# Patient Record
Sex: FEMALE | URBAN - METROPOLITAN AREA
[De-identification: names, ages, dates, MRNs, and addresses within clinical notes are randomized per-mention and may not be internally consistent; named-entity substitution may affect disease eponyms.]

---

## 2023-04-20 ENCOUNTER — OFFICE (OUTPATIENT)
Dept: URBAN - METROPOLITAN AREA CLINIC 29 | Facility: CLINIC | Age: 68
Setting detail: OPHTHALMOLOGY
End: 2023-04-20
Payer: COMMERCIAL

## 2023-04-20 DIAGNOSIS — H25.13: ICD-10-CM

## 2023-04-20 DIAGNOSIS — H10.45: ICD-10-CM

## 2023-04-20 DIAGNOSIS — H16.223: ICD-10-CM

## 2023-04-20 DIAGNOSIS — H25.013: ICD-10-CM

## 2023-04-20 PROCEDURE — 92012 INTRM OPH EXAM EST PATIENT: CPT | Performed by: OPHTHALMOLOGY

## 2023-04-20 ASSESSMENT — REFRACTION_CURRENTRX
OD_OVR_VA: 20/
OS_OVR_VA: 20/
OD_VPRISM_DIRECTION: PROGS
OD_CYLINDER: -0.75
OD_AXIS: 65
OS_SPHERE: -2.50
OS_VPRISM_DIRECTION: PROGS
OS_AXIS: 114
OS_CYLINDER: -1.75
OD_ADD: +2.25
OD_SPHERE: -4.00
OS_ADD: +2.25

## 2023-04-20 ASSESSMENT — TEAR BREAK UP TIME (TBUT)
OD_TBUT: 2+
OS_TBUT: 2+

## 2023-04-20 ASSESSMENT — TONOMETRY: OS_IOP_MMHG: 18

## 2023-04-20 ASSESSMENT — REFRACTION_MANIFEST
OD_ADD: +2.50
OD_VA1: 20/20
OD_AXIS: 85
OD_CYLINDER: -1.75
OS_AXIS: 105
OD_SPHERE: -3.50
OS_SPHERE: -1.75
OS_VA1: 20/20
OS_CYLINDER: -1.75
OS_ADD: +2.50

## 2023-04-20 ASSESSMENT — SPHEQUIV_DERIVED
OD_SPHEQUIV: -4.375
OS_SPHEQUIV: -2.625

## 2023-04-20 ASSESSMENT — CONFRONTATIONAL VISUAL FIELD TEST (CVF)
OD_FINDINGS: FULL
OS_FINDINGS: FULL

## 2023-04-20 ASSESSMENT — OVER_REFRACTION
OD_SPHERE: -0.25
OD_VA1: 20/25
OD_SPHERE: PLANO
OS_SPHERE: PLANO
OS_VA1: 20/25
OS_SPHERE: -0.50

## 2023-04-20 ASSESSMENT — VISUAL ACUITY
OS_BCVA: 20/30-1
OD_BCVA: 20/20-2

## 2023-12-31 ENCOUNTER — RESULT REVIEW (OUTPATIENT)
Age: 68
End: 2023-12-31

## 2023-12-31 ENCOUNTER — TRANSCRIPTION ENCOUNTER (OUTPATIENT)
Age: 68
End: 2023-12-31

## 2024-01-11 PROBLEM — Z00.00 ENCOUNTER FOR PREVENTIVE HEALTH EXAMINATION: Status: ACTIVE | Noted: 2024-01-11

## 2024-01-18 DIAGNOSIS — Z87.09 PERSONAL HISTORY OF OTHER DISEASES OF THE RESPIRATORY SYSTEM: ICD-10-CM

## 2024-01-18 DIAGNOSIS — J12.1 RESPIRATORY SYNCYTIAL VIRUS PNEUMONIA: ICD-10-CM

## 2024-01-18 DIAGNOSIS — Z87.19 PERSONAL HISTORY OF OTHER DISEASES OF THE DIGESTIVE SYSTEM: ICD-10-CM

## 2024-01-18 DIAGNOSIS — Z86.79 PERSONAL HISTORY OF OTHER DISEASES OF THE CIRCULATORY SYSTEM: ICD-10-CM

## 2024-01-18 DIAGNOSIS — Z87.440 PERSONAL HISTORY OF URINARY (TRACT) INFECTIONS: ICD-10-CM

## 2024-01-18 RX ORDER — FLUTICASONE PROPIONATE AND SALMETEROL XINAFOATE 230; 21 UG/1; UG/1
AEROSOL, METERED RESPIRATORY (INHALATION)
Refills: 0 | Status: ACTIVE | COMMUNITY

## 2024-01-18 RX ORDER — TELMISARTAN 20 MG/1
TABLET ORAL
Refills: 0 | Status: ACTIVE | COMMUNITY

## 2024-01-18 RX ORDER — ATORVASTATIN CALCIUM 80 MG/1
TABLET, FILM COATED ORAL
Refills: 0 | Status: ACTIVE | COMMUNITY

## 2024-01-18 RX ORDER — HYDROCODONE BITARTRATE AND HOMATROPINE METHYLBROMIDE 1.5; 5 MG/5ML; MG/5ML
SOLUTION ORAL
Refills: 0 | Status: ACTIVE | COMMUNITY

## 2024-01-18 RX ORDER — IPRATROPIUM BROMIDE AND ALBUTEROL SULFATE 2.5; .5 MG/3ML; MG/3ML
SOLUTION RESPIRATORY (INHALATION)
Refills: 0 | Status: ACTIVE | COMMUNITY

## 2024-01-18 RX ORDER — LEVOFLOXACIN 750 MG/1
TABLET, FILM COATED ORAL
Refills: 0 | Status: ACTIVE | COMMUNITY

## 2024-01-18 RX ORDER — ALBUTEROL SULFATE 90 UG/1
INHALANT RESPIRATORY (INHALATION)
Refills: 0 | Status: ACTIVE | COMMUNITY

## 2024-01-19 ENCOUNTER — APPOINTMENT (OUTPATIENT)
Dept: SURGERY | Facility: CLINIC | Age: 69
End: 2024-01-19
Payer: COMMERCIAL

## 2024-01-19 VITALS
WEIGHT: 114 LBS | SYSTOLIC BLOOD PRESSURE: 142 MMHG | BODY MASS INDEX: 18.99 KG/M2 | DIASTOLIC BLOOD PRESSURE: 79 MMHG | HEART RATE: 68 BPM | HEIGHT: 65 IN

## 2024-01-19 DIAGNOSIS — K40.30 UNILATERAL INGUINAL HERNIA, WITH OBSTRUCTION, W/OUT GANGRENE, NOT SPECIFIED AS RECURRENT: ICD-10-CM

## 2024-01-19 PROCEDURE — 99024 POSTOP FOLLOW-UP VISIT: CPT

## 2024-01-19 NOTE — HISTORY OF PRESENT ILLNESS
[de-identified] : incarcerated right femoral hernia s/p robotic repair 12/31 [de-identified] : Has been doing well since surgery.  Tolerating diet.  c/o some right sided abdominal soreness.  denies N/V.

## 2024-01-19 NOTE — PHYSICAL EXAM
[JVD] : no jugular venous distention  [Normal Breath Sounds] : Normal breath sounds [Normal Rate and Rhythm] : normal rate and rhythm [No Rash or Lesion] : No rash or lesion [de-identified] : soft, NT/ND, incisions well healed

## 2024-01-19 NOTE — ASSESSMENT
[FreeTextEntry1] : s/p robotic right femoral hernia repair for incarcerated hernia.  doing well.  some abdominal wall soreness - resume regular activities - monitor soreness, f/u in 3-4 months as needed

## 2024-04-18 ENCOUNTER — APPOINTMENT (OUTPATIENT)
Dept: SURGERY | Facility: CLINIC | Age: 69
End: 2024-04-18
Payer: COMMERCIAL

## 2024-04-18 VITALS — SYSTOLIC BLOOD PRESSURE: 133 MMHG | TEMPERATURE: 97.5 F | DIASTOLIC BLOOD PRESSURE: 82 MMHG | HEART RATE: 86 BPM

## 2024-04-18 DIAGNOSIS — R10.30 LOWER ABDOMINAL PAIN, UNSPECIFIED: ICD-10-CM

## 2024-04-18 PROCEDURE — 99213 OFFICE O/P EST LOW 20 MIN: CPT

## 2024-04-18 NOTE — PHYSICAL EXAM
[JVD] : no jugular venous distention  [Normal Breath Sounds] : Normal breath sounds [Normal Rate and Rhythm] : normal rate and rhythm [No Rash or Lesion] : No rash or lesion [Alert] : alert [Calm] : calm [de-identified] : soft, NT/ND, no palpable hernia

## 2024-04-18 NOTE — REVIEW OF SYSTEMS
[As Noted in HPI] : as noted in HPI [Vomiting] : no vomiting [Constipation] : no constipation [Diarrhea] : no diarrhea [Heartburn] : no heartburn [Negative] : Genitourinary

## 2024-04-18 NOTE — HISTORY OF PRESENT ILLNESS
[de-identified] : abdominal discomfort [de-identified] : s/p robotic right inguinal hernia repair december 2023 for incarcerated femoral here.  presents today with complaints of some right sided abdominal discomfort.  pain is intermittent.  does not require pain meds.  also notes some abdominal wall "unevenness."

## 2024-04-18 NOTE — ASSESSMENT
[FreeTextEntry1] : 4 months post op.  here with some intermittent right sided abdominal discomfort will get ct a/ap

## 2024-04-19 ENCOUNTER — NON-APPOINTMENT (OUTPATIENT)
Age: 69
End: 2024-04-19

## 2024-04-24 ENCOUNTER — RESULT REVIEW (OUTPATIENT)
Age: 69
End: 2024-04-24

## 2024-04-25 ENCOUNTER — RESULT REVIEW (OUTPATIENT)
Age: 69
End: 2024-04-25

## 2024-04-26 ENCOUNTER — TRANSCRIPTION ENCOUNTER (OUTPATIENT)
Age: 69
End: 2024-04-26

## 2024-05-02 ENCOUNTER — TRANSCRIPTION ENCOUNTER (OUTPATIENT)
Age: 69
End: 2024-05-02

## 2024-08-16 ENCOUNTER — OFFICE (OUTPATIENT)
Dept: URBAN - METROPOLITAN AREA CLINIC 29 | Facility: CLINIC | Age: 69
Setting detail: OPHTHALMOLOGY
End: 2024-08-16
Payer: COMMERCIAL

## 2024-08-16 DIAGNOSIS — H01.005: ICD-10-CM

## 2024-08-16 DIAGNOSIS — H01.002: ICD-10-CM

## 2024-08-16 DIAGNOSIS — H25.13: ICD-10-CM

## 2024-08-16 DIAGNOSIS — H16.223: ICD-10-CM

## 2024-08-16 DIAGNOSIS — H00.022: ICD-10-CM

## 2024-08-16 DIAGNOSIS — H25.013: ICD-10-CM

## 2024-08-16 PROCEDURE — 99213 OFFICE O/P EST LOW 20 MIN: CPT | Performed by: OPHTHALMOLOGY

## 2024-08-16 ASSESSMENT — CONFRONTATIONAL VISUAL FIELD TEST (CVF)
OS_FINDINGS: FULL
OD_FINDINGS: FULL

## 2024-08-16 ASSESSMENT — LID EXAM ASSESSMENTS
OS_BLEPHARITIS: LLL T
OD_BLEPHARITIS: RLL 1+

## 2024-08-23 ENCOUNTER — OFFICE (OUTPATIENT)
Dept: URBAN - METROPOLITAN AREA CLINIC 29 | Facility: CLINIC | Age: 69
Setting detail: OPHTHALMOLOGY
End: 2024-08-23
Payer: COMMERCIAL

## 2024-08-23 DIAGNOSIS — H35.362: ICD-10-CM

## 2024-08-23 DIAGNOSIS — H01.002: ICD-10-CM

## 2024-08-23 DIAGNOSIS — H16.223: ICD-10-CM

## 2024-08-23 DIAGNOSIS — D31.01: ICD-10-CM

## 2024-08-23 DIAGNOSIS — H43.391: ICD-10-CM

## 2024-08-23 DIAGNOSIS — H43.813: ICD-10-CM

## 2024-08-23 DIAGNOSIS — H00.022: ICD-10-CM

## 2024-08-23 DIAGNOSIS — H01.005: ICD-10-CM

## 2024-08-23 DIAGNOSIS — H25.013: ICD-10-CM

## 2024-08-23 DIAGNOSIS — H25.13: ICD-10-CM

## 2024-08-23 PROCEDURE — 92014 COMPRE OPH EXAM EST PT 1/>: CPT | Performed by: OPHTHALMOLOGY

## 2024-08-23 PROCEDURE — 92201 OPSCPY EXTND RTA DRAW UNI/BI: CPT | Performed by: OPHTHALMOLOGY

## 2024-08-23 PROCEDURE — 92015 DETERMINE REFRACTIVE STATE: CPT | Performed by: OPHTHALMOLOGY

## 2024-08-23 ASSESSMENT — CONFRONTATIONAL VISUAL FIELD TEST (CVF)
OS_FINDINGS: FULL
OD_FINDINGS: FULL

## 2024-08-23 ASSESSMENT — LID EXAM ASSESSMENTS
OD_BLEPHARITIS: RLL 1+
OS_BLEPHARITIS: LLL T

## 2024-08-27 ENCOUNTER — OFFICE (OUTPATIENT)
Dept: URBAN - METROPOLITAN AREA CLINIC 122 | Facility: CLINIC | Age: 69
Setting detail: OPHTHALMOLOGY
End: 2024-08-27
Payer: COMMERCIAL

## 2024-08-27 DIAGNOSIS — H00.022: ICD-10-CM

## 2024-08-27 DIAGNOSIS — H01.005: ICD-10-CM

## 2024-08-27 DIAGNOSIS — H01.002: ICD-10-CM

## 2024-08-27 PROBLEM — H43.813 POSTERIOR VITREOUS DETACHMENT; BOTH EYES: Status: ACTIVE | Noted: 2024-08-23

## 2024-08-27 PROCEDURE — 92012 INTRM OPH EXAM EST PATIENT: CPT | Performed by: OPHTHALMOLOGY

## 2024-08-27 ASSESSMENT — LID EXAM ASSESSMENTS
OS_BLEPHARITIS: LLL T
OD_BLEPHARITIS: RLL 1+

## 2024-08-27 ASSESSMENT — CONFRONTATIONAL VISUAL FIELD TEST (CVF)
OD_FINDINGS: FULL
OS_FINDINGS: FULL

## 2024-09-17 ENCOUNTER — RX ONLY (RX ONLY)
Age: 69
End: 2024-09-17

## 2024-09-17 ENCOUNTER — OFFICE (OUTPATIENT)
Dept: URBAN - METROPOLITAN AREA CLINIC 122 | Facility: CLINIC | Age: 69
Setting detail: OPHTHALMOLOGY
End: 2024-09-17
Payer: COMMERCIAL

## 2024-09-17 DIAGNOSIS — H16.223: ICD-10-CM

## 2024-09-17 DIAGNOSIS — H01.005: ICD-10-CM

## 2024-09-17 DIAGNOSIS — H00.022: ICD-10-CM

## 2024-09-17 DIAGNOSIS — H01.002: ICD-10-CM

## 2024-09-17 PROCEDURE — 92012 INTRM OPH EXAM EST PATIENT: CPT

## 2024-09-17 ASSESSMENT — LID EXAM ASSESSMENTS
OD_BLEPHARITIS: RLL 1+
OS_BLEPHARITIS: LLL T

## 2024-10-01 ENCOUNTER — OFFICE (OUTPATIENT)
Dept: URBAN - METROPOLITAN AREA CLINIC 122 | Facility: CLINIC | Age: 69
Setting detail: OPHTHALMOLOGY
End: 2024-10-01
Payer: COMMERCIAL

## 2024-10-01 DIAGNOSIS — H01.002: ICD-10-CM

## 2024-10-01 DIAGNOSIS — H00.022: ICD-10-CM

## 2024-10-01 DIAGNOSIS — H16.223: ICD-10-CM

## 2024-10-01 DIAGNOSIS — H01.005: ICD-10-CM

## 2024-10-01 PROCEDURE — 92012 INTRM OPH EXAM EST PATIENT: CPT

## 2024-10-01 ASSESSMENT — REFRACTION_CURRENTRX
OD_CYLINDER: -0.75
OD_CYLINDER: +0.50
OS_OVR_VA: 20/
OD_ADD: +2.75
OS_CYLINDER: -1.75
OS_SPHERE: -4.25
OD_OVR_VA: 20/
OS_ADD: +2.75
OS_VPRISM_DIRECTION: PROGS
OS_AXIS: 114
OS_ADD: +2.25
OD_SPHERE: -4.00
OD_AXIS: 65
OD_AXIS: 155
OD_VPRISM_DIRECTION: PROGS
OS_CYLINDER: +1.75
OS_OVR_VA: 20/
OD_OVR_VA: 20/
OS_AXIS: 035
OS_VPRISM_DIRECTION: PROGS
OD_SPHERE: -5.00
OS_SPHERE: -2.50
OD_ADD: +2.25
OD_VPRISM_DIRECTION: PROGS

## 2024-10-01 ASSESSMENT — REFRACTION_MANIFEST
OD_CYLINDER: -2.25
OD_CYLINDER: -1.75
OD_SPHERE: -3.00
OS_SPHERE: -1.00
OS_VA1: 20/20-2
OD_ADD: +2.50
OS_CYLINDER: -1.75
OS_SPHERE: -1.75
OS_AXIS: 105
OS_VA1: 20/20
OD_SPHERE: -3.50
OD_VA1: 20/20-2
OS_AXIS: 095
OD_AXIS: 085
OD_ADD: +2.50
OS_CYLINDER: -2.00
OS_ADD: +2.50
OD_VA1: 20/20
OD_AXIS: 85
OS_ADD: +2.50

## 2024-10-01 ASSESSMENT — KERATOMETRY
OS_K1POWER_DIOPTERS: 44.00
OD_K2POWER_DIOPTERS: 45.25
OS_AXISANGLE_DEGREES: 020
OD_K1POWER_DIOPTERS: 45.00
OD_AXISANGLE_DEGREES: 135
OS_K2POWER_DIOPTERS: 45.25

## 2024-10-01 ASSESSMENT — OVER_REFRACTION
OS_SPHERE: -0.50
OD_VA1: 20/25
OD_SPHERE: PLANO
OD_SPHERE: -0.25
OS_SPHERE: PLANO
OS_VA1: 20/25

## 2024-10-01 ASSESSMENT — TEAR BREAK UP TIME (TBUT)
OS_TBUT: 2+
OD_TBUT: 2+

## 2024-10-01 ASSESSMENT — VISUAL ACUITY
OD_BCVA: 20/20-2
OS_BCVA: 20/20-

## 2024-10-01 ASSESSMENT — REFRACTION_AUTOREFRACTION
OD_CYLINDER: +2.25
OS_AXIS: 010
OD_AXIS: 170
OD_SPHERE: -5.25
OS_SPHERE: -3.25
OS_CYLINDER: +2.00

## 2024-10-01 ASSESSMENT — LID EXAM ASSESSMENTS
OD_BLEPHARITIS: RLL 1+
OS_BLEPHARITIS: LLL T

## 2024-10-01 ASSESSMENT — TONOMETRY
OS_IOP_MMHG: 16
OD_IOP_MMHG: 16

## 2024-10-25 ENCOUNTER — RX ONLY (RX ONLY)
Age: 69
End: 2024-10-25

## 2024-10-25 ENCOUNTER — OFFICE (OUTPATIENT)
Dept: URBAN - METROPOLITAN AREA CLINIC 29 | Facility: CLINIC | Age: 69
Setting detail: OPHTHALMOLOGY
End: 2024-10-25
Payer: COMMERCIAL

## 2024-10-25 DIAGNOSIS — H00.022: ICD-10-CM

## 2024-10-25 DIAGNOSIS — H02.89: ICD-10-CM

## 2024-10-25 DIAGNOSIS — D23.122: ICD-10-CM

## 2024-10-25 DIAGNOSIS — H01.002: ICD-10-CM

## 2024-10-25 DIAGNOSIS — H16.223: ICD-10-CM

## 2024-10-25 DIAGNOSIS — H01.005: ICD-10-CM

## 2024-10-25 PROCEDURE — 92285 EXTERNAL OCULAR PHOTOGRAPHY: CPT | Performed by: OPHTHALMOLOGY

## 2024-10-25 PROCEDURE — 99213 OFFICE O/P EST LOW 20 MIN: CPT | Performed by: OPHTHALMOLOGY

## 2024-10-25 ASSESSMENT — REFRACTION_MANIFEST
OD_AXIS: 085
OS_SPHERE: -1.00
OD_CYLINDER: -2.25
OD_ADD: +2.50
OS_ADD: +2.50
OS_VA1: 20/20-2
OD_VA1: 20/20-2
OS_AXIS: 105
OD_VA1: 20/20
OS_ADD: +2.50
OD_SPHERE: -3.00
OS_CYLINDER: -1.75
OS_CYLINDER: -2.00
OD_ADD: +2.50
OS_VA1: 20/20
OD_CYLINDER: -1.75
OS_SPHERE: -1.75
OD_SPHERE: -3.50
OD_AXIS: 85
OS_AXIS: 095

## 2024-10-25 ASSESSMENT — REFRACTION_CURRENTRX
OD_OVR_VA: 20/
OS_CYLINDER: +1.75
OD_CYLINDER: +0.50
OD_CYLINDER: -0.75
OS_OVR_VA: 20/
OS_SPHERE: -2.50
OS_OVR_VA: 20/
OS_SPHERE: -4.25
OS_VPRISM_DIRECTION: PROGS
OS_ADD: +2.25
OD_AXIS: 155
OD_ADD: +2.25
OD_OVR_VA: 20/
OS_AXIS: 035
OS_CYLINDER: -1.75
OS_AXIS: 114
OD_SPHERE: -4.00
OS_VPRISM_DIRECTION: PROGS
OD_ADD: +2.75
OD_VPRISM_DIRECTION: PROGS
OD_SPHERE: -5.00
OS_ADD: +2.75
OD_AXIS: 65
OD_VPRISM_DIRECTION: PROGS

## 2024-10-25 ASSESSMENT — TEAR BREAK UP TIME (TBUT)
OD_TBUT: 2+
OS_TBUT: 2+

## 2024-10-25 ASSESSMENT — LID EXAM ASSESSMENTS
OD_BLEPHARITIS: RLL 1+
OS_BLEPHARITIS: LLL T
OD_MEIBOMITIS: RLL 1+

## 2024-10-25 ASSESSMENT — REFRACTION_AUTOREFRACTION
OS_AXIS: 010
OS_SPHERE: -3.25
OS_CYLINDER: +2.00
OD_CYLINDER: +2.25
OD_AXIS: 170
OD_SPHERE: -5.25

## 2024-10-25 ASSESSMENT — CONFRONTATIONAL VISUAL FIELD TEST (CVF)
OD_FINDINGS: FULL
OS_FINDINGS: FULL

## 2024-10-25 ASSESSMENT — TONOMETRY
OS_IOP_MMHG: 14
OD_IOP_MMHG: 14

## 2024-10-25 ASSESSMENT — OVER_REFRACTION
OS_SPHERE: PLANO
OS_SPHERE: -0.50
OD_SPHERE: PLANO
OD_SPHERE: -0.25
OD_VA1: 20/25
OS_VA1: 20/25

## 2024-10-25 ASSESSMENT — VISUAL ACUITY
OD_BCVA: 20/25-2
OS_BCVA: 20/25+1

## 2024-10-25 ASSESSMENT — KERATOMETRY
OD_K2POWER_DIOPTERS: 45.25
OS_K2POWER_DIOPTERS: 45.25
OD_AXISANGLE_DEGREES: 135
OS_AXISANGLE_DEGREES: 020
OD_K1POWER_DIOPTERS: 45.00
OS_K1POWER_DIOPTERS: 44.00

## 2024-11-06 ENCOUNTER — RX ONLY (RX ONLY)
Age: 69
End: 2024-11-06

## 2024-11-06 ENCOUNTER — OFFICE (OUTPATIENT)
Dept: URBAN - METROPOLITAN AREA CLINIC 122 | Facility: CLINIC | Age: 69
Setting detail: OPHTHALMOLOGY
End: 2024-11-06
Payer: COMMERCIAL

## 2024-11-06 DIAGNOSIS — H16.223: ICD-10-CM

## 2024-11-06 DIAGNOSIS — H01.002: ICD-10-CM

## 2024-11-06 DIAGNOSIS — H00.12: ICD-10-CM

## 2024-11-06 DIAGNOSIS — H02.89: ICD-10-CM

## 2024-11-06 DIAGNOSIS — H01.005: ICD-10-CM

## 2024-11-06 DIAGNOSIS — D23.122: ICD-10-CM

## 2024-11-06 PROCEDURE — 92012 INTRM OPH EXAM EST PATIENT: CPT | Performed by: OPHTHALMOLOGY

## 2024-11-06 ASSESSMENT — REFRACTION_MANIFEST
OD_VA1: 20/20-2
OS_CYLINDER: -2.00
OS_AXIS: 105
OD_ADD: +2.50
OD_CYLINDER: -1.75
OS_ADD: +2.50
OD_ADD: +2.50
OS_VA1: 20/20-2
OD_SPHERE: -3.00
OS_SPHERE: -1.75
OD_SPHERE: -3.50
OS_AXIS: 095
OS_CYLINDER: -1.75
OD_CYLINDER: -2.25
OS_ADD: +2.50
OS_SPHERE: -1.00
OD_AXIS: 85
OS_VA1: 20/20
OD_VA1: 20/20
OD_AXIS: 085

## 2024-11-06 ASSESSMENT — REFRACTION_CURRENTRX
OS_CYLINDER: +1.75
OD_OVR_VA: 20/
OS_VPRISM_DIRECTION: PROGS
OD_ADD: +2.75
OS_CYLINDER: -1.75
OD_CYLINDER: +0.50
OS_ADD: +2.75
OS_SPHERE: -2.50
OD_AXIS: 155
OS_OVR_VA: 20/
OS_ADD: +2.25
OD_SPHERE: -4.00
OS_VPRISM_DIRECTION: PROGS
OD_CYLINDER: -0.75
OS_SPHERE: -4.25
OS_AXIS: 035
OD_AXIS: 65
OD_OVR_VA: 20/
OS_OVR_VA: 20/
OD_VPRISM_DIRECTION: PROGS
OD_VPRISM_DIRECTION: PROGS
OD_ADD: +2.25
OS_AXIS: 114
OD_SPHERE: -5.00

## 2024-11-06 ASSESSMENT — OVER_REFRACTION
OD_SPHERE: PLANO
OS_SPHERE: PLANO
OS_SPHERE: -0.50
OD_VA1: 20/25
OS_VA1: 20/25
OD_SPHERE: -0.25

## 2024-11-06 ASSESSMENT — REFRACTION_AUTOREFRACTION
OS_AXIS: 010
OD_CYLINDER: +2.25
OD_SPHERE: -5.25
OS_CYLINDER: +2.00
OD_AXIS: 170
OS_SPHERE: -3.25

## 2024-11-06 ASSESSMENT — KERATOMETRY
OD_K1POWER_DIOPTERS: 45.00
OS_K2POWER_DIOPTERS: 45.25
OD_K2POWER_DIOPTERS: 45.25
OS_AXISANGLE_DEGREES: 020
OS_K1POWER_DIOPTERS: 44.00
OD_AXISANGLE_DEGREES: 135

## 2024-11-06 ASSESSMENT — LID EXAM ASSESSMENTS
OD_BLEPHARITIS: RLL 1+
OD_MEIBOMITIS: RLL 1+
OS_BLEPHARITIS: LLL T

## 2024-11-06 ASSESSMENT — CONFRONTATIONAL VISUAL FIELD TEST (CVF)
OS_FINDINGS: FULL
OD_FINDINGS: FULL

## 2024-11-06 ASSESSMENT — TEAR BREAK UP TIME (TBUT)
OS_TBUT: 2+
OD_TBUT: 2+

## 2024-11-06 ASSESSMENT — VISUAL ACUITY
OS_BCVA: 20/30
OD_BCVA: 20/25-2

## 2024-11-06 ASSESSMENT — TONOMETRY: OD_IOP_MMHG: 16

## 2024-11-25 ENCOUNTER — RX ONLY (RX ONLY)
Age: 69
End: 2024-11-25

## 2024-11-25 ENCOUNTER — OFFICE (OUTPATIENT)
Dept: URBAN - METROPOLITAN AREA CLINIC 122 | Facility: CLINIC | Age: 69
Setting detail: OPHTHALMOLOGY
End: 2024-11-25
Payer: COMMERCIAL

## 2024-11-25 DIAGNOSIS — H01.005: ICD-10-CM

## 2024-11-25 DIAGNOSIS — H43.391: ICD-10-CM

## 2024-11-25 DIAGNOSIS — D31.01: ICD-10-CM

## 2024-11-25 DIAGNOSIS — H35.362: ICD-10-CM

## 2024-11-25 DIAGNOSIS — H43.813: ICD-10-CM

## 2024-11-25 DIAGNOSIS — H16.223: ICD-10-CM

## 2024-11-25 DIAGNOSIS — H25.013: ICD-10-CM

## 2024-11-25 DIAGNOSIS — H01.002: ICD-10-CM

## 2024-11-25 DIAGNOSIS — H00.12: ICD-10-CM

## 2024-11-25 DIAGNOSIS — D23.122: ICD-10-CM

## 2024-11-25 DIAGNOSIS — H02.89: ICD-10-CM

## 2024-11-25 PROCEDURE — 92014 COMPRE OPH EXAM EST PT 1/>: CPT | Performed by: OPHTHALMOLOGY

## 2024-11-25 PROCEDURE — 92134 CPTRZ OPH DX IMG PST SGM RTA: CPT | Performed by: OPHTHALMOLOGY

## 2024-11-25 ASSESSMENT — REFRACTION_MANIFEST
OD_AXIS: 85
OS_SPHERE: -1.75
OS_VA1: 20/20-2
OS_ADD: +2.50
OD_AXIS: 085
OD_CYLINDER: -2.25
OS_SPHERE: -1.00
OS_AXIS: 095
OD_SPHERE: -3.50
OD_VA1: 20/20
OS_ADD: +2.50
OD_CYLINDER: -1.75
OS_CYLINDER: -2.00
OS_VA1: 20/20
OS_AXIS: 105
OD_SPHERE: -3.00
OD_VA1: 20/20-2
OS_CYLINDER: -1.75
OD_ADD: +2.50
OD_ADD: +2.50

## 2024-11-25 ASSESSMENT — OVER_REFRACTION
OS_SPHERE: -0.50
OS_SPHERE: PLANO
OD_SPHERE: PLANO
OD_VA1: 20/25
OS_VA1: 20/25
OD_SPHERE: -0.25

## 2024-11-25 ASSESSMENT — LID EXAM ASSESSMENTS
OD_MEIBOMITIS: RLL 1+
OS_BLEPHARITIS: LLL T
OD_BLEPHARITIS: RLL 1+

## 2024-11-25 ASSESSMENT — TONOMETRY
OS_IOP_MMHG: 13
OD_IOP_MMHG: 12

## 2024-11-25 ASSESSMENT — KERATOMETRY
OS_K1POWER_DIOPTERS: 44.00
OS_K2POWER_DIOPTERS: 45.25
OD_AXISANGLE_DEGREES: 135
OD_K1POWER_DIOPTERS: 45.00
OD_K2POWER_DIOPTERS: 45.25
OS_AXISANGLE_DEGREES: 020

## 2024-11-25 ASSESSMENT — REFRACTION_CURRENTRX
OD_AXIS: 66
OD_AXIS: 155
OD_OVR_VA: 20/
OS_AXIS: 116
OS_OVR_VA: 20/
OD_VPRISM_DIRECTION: PROGS
OS_CYLINDER: +1.75
OD_SPHERE: -5.00
OS_CYLINDER: -1.75
OD_CYLINDER: --1.00
OD_SPHERE: -4.25
OD_ADD: +2.25
OD_ADD: +2.75
OS_ADD: +2.25
OS_VPRISM_DIRECTION: PROGS
OS_SPHERE: -2.25
OD_OVR_VA: 20/
OS_AXIS: 035
OS_SPHERE: -4.25
OS_ADD: +2.75
OD_CYLINDER: +0.50
OS_VPRISM_DIRECTION: PROGS
OS_OVR_VA: 20/
OD_VPRISM_DIRECTION: PROGS

## 2024-11-25 ASSESSMENT — VISUAL ACUITY
OD_BCVA: 20/30
OS_BCVA: 20/40

## 2024-11-25 ASSESSMENT — CONFRONTATIONAL VISUAL FIELD TEST (CVF)
OS_FINDINGS: FULL
OD_FINDINGS: FULL

## 2024-11-25 ASSESSMENT — REFRACTION_AUTOREFRACTION
OD_AXIS: 74
OS_AXIS: 95
OS_CYLINDER: -2.25
OS_SPHERE: -1.50
OD_CYLINDER: -1.75
OD_SPHERE: -2.75

## 2024-11-25 ASSESSMENT — TEAR BREAK UP TIME (TBUT)
OD_TBUT: 2+
OS_TBUT: 2+

## 2024-12-10 ENCOUNTER — OFFICE (OUTPATIENT)
Dept: URBAN - METROPOLITAN AREA CLINIC 122 | Facility: CLINIC | Age: 69
Setting detail: OPHTHALMOLOGY
End: 2024-12-10
Payer: COMMERCIAL

## 2024-12-10 DIAGNOSIS — H00.12: ICD-10-CM

## 2024-12-10 PROCEDURE — 67800 REMOVE EYELID LESION: CPT | Mod: E4 | Performed by: OPHTHALMOLOGY

## 2024-12-10 ASSESSMENT — REFRACTION_MANIFEST
OS_VA1: 20/20
OS_CYLINDER: -2.00
OS_CYLINDER: -1.75
OD_VA1: 20/20
OS_ADD: +2.50
OS_AXIS: 095
OD_AXIS: 085
OD_AXIS: 85
OD_ADD: +2.50
OS_AXIS: 105
OD_SPHERE: -3.50
OD_SPHERE: -3.00
OD_CYLINDER: -2.25
OS_SPHERE: -1.75
OD_CYLINDER: -1.75
OS_SPHERE: -1.00
OD_ADD: +2.50
OD_VA1: 20/20-2
OS_VA1: 20/20-2
OS_ADD: +2.50

## 2024-12-10 ASSESSMENT — LID EXAM ASSESSMENTS
OD_BLEPHARITIS: RLL 1+
OD_MEIBOMITIS: RLL 1+
OS_BLEPHARITIS: LLL T

## 2024-12-10 ASSESSMENT — OVER_REFRACTION
OS_SPHERE: PLANO
OD_SPHERE: PLANO
OS_SPHERE: -0.50
OD_SPHERE: -0.25
OD_VA1: 20/25
OS_VA1: 20/25

## 2024-12-10 ASSESSMENT — KERATOMETRY
OD_K2POWER_DIOPTERS: 45.25
OS_K2POWER_DIOPTERS: 45.25
OS_AXISANGLE_DEGREES: 020
OS_K1POWER_DIOPTERS: 44.00
OD_AXISANGLE_DEGREES: 135
OD_K1POWER_DIOPTERS: 45.00

## 2024-12-10 ASSESSMENT — REFRACTION_CURRENTRX
OD_VPRISM_DIRECTION: PROGS
OS_CYLINDER: -1.75
OS_SPHERE: -2.25
OD_SPHERE: -5.00
OS_VPRISM_DIRECTION: PROGS
OD_ADD: +2.25
OS_ADD: +2.25
OD_CYLINDER: +0.50
OD_OVR_VA: 20/
OD_SPHERE: -4.25
OD_VPRISM_DIRECTION: PROGS
OS_SPHERE: -4.25
OS_VPRISM_DIRECTION: PROGS
OS_CYLINDER: +1.75
OS_AXIS: 035
OS_AXIS: 116
OD_AXIS: 66
OS_OVR_VA: 20/
OD_ADD: +2.75
OS_ADD: +2.75
OS_OVR_VA: 20/
OD_CYLINDER: --1.00
OD_AXIS: 155
OD_OVR_VA: 20/

## 2024-12-10 ASSESSMENT — TEAR BREAK UP TIME (TBUT)
OS_TBUT: 2+
OD_TBUT: 2+

## 2024-12-10 ASSESSMENT — REFRACTION_AUTOREFRACTION
OS_CYLINDER: -2.25
OS_AXIS: 95
OD_SPHERE: -2.75
OD_CYLINDER: -1.75
OD_AXIS: 74
OS_SPHERE: -1.50

## 2024-12-10 ASSESSMENT — VISUAL ACUITY
OD_BCVA: 20/30
OS_BCVA: 20/40

## 2025-01-10 ENCOUNTER — OFFICE (OUTPATIENT)
Dept: URBAN - METROPOLITAN AREA CLINIC 122 | Facility: CLINIC | Age: 70
Setting detail: OPHTHALMOLOGY
End: 2025-01-10
Payer: COMMERCIAL

## 2025-01-10 DIAGNOSIS — H25.013: ICD-10-CM

## 2025-01-10 DIAGNOSIS — H01.005: ICD-10-CM

## 2025-01-10 DIAGNOSIS — H01.002: ICD-10-CM

## 2025-01-10 DIAGNOSIS — D23.122: ICD-10-CM

## 2025-01-10 DIAGNOSIS — H43.391: ICD-10-CM

## 2025-01-10 DIAGNOSIS — H35.362: ICD-10-CM

## 2025-01-10 DIAGNOSIS — H02.89: ICD-10-CM

## 2025-01-10 DIAGNOSIS — H43.813: ICD-10-CM

## 2025-01-10 DIAGNOSIS — H52.7: ICD-10-CM

## 2025-01-10 DIAGNOSIS — H25.13: ICD-10-CM

## 2025-01-10 DIAGNOSIS — D31.01: ICD-10-CM

## 2025-01-10 DIAGNOSIS — H16.223: ICD-10-CM

## 2025-01-10 PROCEDURE — 92012 INTRM OPH EXAM EST PATIENT: CPT | Performed by: OPHTHALMOLOGY

## 2025-01-10 PROCEDURE — 92015 DETERMINE REFRACTIVE STATE: CPT | Performed by: OPHTHALMOLOGY

## 2025-01-10 ASSESSMENT — REFRACTION_MANIFEST
OS_CYLINDER: -1.75
OD_VA1: 20/20
OS_VA1: 20/20
OD_SPHERE: -3.50
OS_ADD: +2.50
OD_AXIS: 85
OS_AXIS: 095
OD_SPHERE: -3.00
OD_VA1: 20/20-2
OS_VA1: 20/20
OD_ADD: +2.50
OS_ADD: +2.50
OS_SPHERE: -1.75
OD_ADD: +2.50
OS_ADD: +2.50
OS_AXIS: 80
OS_AXIS: 105
OS_SPHERE: -1.00
OS_VA1: 20/20-2
OD_ADD: +2.50
OD_CYLINDER: -2.25
OD_CYLINDER: -2.00
OD_AXIS: 085
OS_SPHERE: -2.25
OD_VA1: 20/20
OD_CYLINDER: -1.75
OS_CYLINDER: -2.00
OD_AXIS: 65
OD_SPHERE: -2.50
OS_CYLINDER: -2.00

## 2025-01-10 ASSESSMENT — KERATOMETRY
OS_AXISANGLE_DEGREES: 020
OD_K2POWER_DIOPTERS: 45.25
OS_K2POWER_DIOPTERS: 45.25
OD_AXISANGLE_DEGREES: 135
OD_K1POWER_DIOPTERS: 45.00
OS_K1POWER_DIOPTERS: 44.00

## 2025-01-10 ASSESSMENT — REFRACTION_CURRENTRX
OS_VPRISM_DIRECTION: PROGS
OD_CYLINDER: +0.50
OD_AXIS: 155
OS_SPHERE: -4.25
OD_ADD: +2.25
OD_OVR_VA: 20/
OS_AXIS: 035
OD_SPHERE: -5.00
OS_ADD: +2.25
OS_OVR_VA: 20/
OD_VPRISM_DIRECTION: PROGS
OD_VPRISM_DIRECTION: PROGS
OS_SPHERE: -2.25
OS_AXIS: 116
OS_OVR_VA: 20/
OS_ADD: +2.75
OD_ADD: +2.75
OD_CYLINDER: -1.00
OD_SPHERE: -4.25
OD_AXIS: 66
OD_OVR_VA: 20/
OS_CYLINDER: -1.75
OS_VPRISM_DIRECTION: PROGS
OS_CYLINDER: +1.75

## 2025-01-10 ASSESSMENT — LID EXAM ASSESSMENTS
OD_BLEPHARITIS: RLL 1+
OD_MEIBOMITIS: RLL 1+
OS_BLEPHARITIS: LLL T

## 2025-01-10 ASSESSMENT — REFRACTION_AUTOREFRACTION
OS_SPHERE: -1.25
OS_CYLINDER: -2.00
OD_CYLINDER: -2.00
OD_SPHERE: -2.50
OD_AXIS: 76
OS_AXIS: 99

## 2025-01-10 ASSESSMENT — OVER_REFRACTION
OD_SPHERE: PLANO
OD_SPHERE: -0.25
OD_VA1: 20/25
OS_SPHERE: -0.50
OS_VA1: 20/25
OS_SPHERE: PLANO

## 2025-01-10 ASSESSMENT — CONFRONTATIONAL VISUAL FIELD TEST (CVF)
OS_FINDINGS: FULL
OD_FINDINGS: FULL

## 2025-01-10 ASSESSMENT — VISUAL ACUITY
OD_BCVA: 20/25
OS_BCVA: 20/30-

## 2025-01-10 ASSESSMENT — TONOMETRY
OS_IOP_MMHG: 14
OD_IOP_MMHG: 14

## 2025-01-10 ASSESSMENT — TEAR BREAK UP TIME (TBUT)
OS_TBUT: 2+
OD_TBUT: 2+

## 2025-01-13 ENCOUNTER — OFFICE (OUTPATIENT)
Dept: URBAN - METROPOLITAN AREA CLINIC 29 | Facility: CLINIC | Age: 70
Setting detail: OPHTHALMOLOGY
End: 2025-01-13
Payer: COMMERCIAL

## 2025-01-13 DIAGNOSIS — H02.822: ICD-10-CM

## 2025-01-13 PROCEDURE — 67840 REMOVE EYELID LESION: CPT | Mod: E4 | Performed by: OPHTHALMOLOGY

## 2025-01-13 PROCEDURE — 99214 OFFICE O/P EST MOD 30 MIN: CPT | Mod: 25 | Performed by: OPHTHALMOLOGY

## 2025-01-13 ASSESSMENT — KERATOMETRY
OD_AXISANGLE_DEGREES: 135
OD_K1POWER_DIOPTERS: 45.00
OD_K2POWER_DIOPTERS: 45.25
OS_K1POWER_DIOPTERS: 44.00
OS_K2POWER_DIOPTERS: 45.25
OS_AXISANGLE_DEGREES: 020

## 2025-01-13 ASSESSMENT — REFRACTION_AUTOREFRACTION
OD_AXIS: 76
OS_SPHERE: -1.25
OD_CYLINDER: -2.00
OS_CYLINDER: -2.00
OS_AXIS: 99
OD_SPHERE: -2.50

## 2025-01-13 ASSESSMENT — LID EXAM ASSESSMENTS
OS_BLEPHARITIS: LLL T
OD_MEIBOMITIS: RLL 1+
OD_BLEPHARITIS: RLL 1+

## 2025-01-13 ASSESSMENT — TEAR BREAK UP TIME (TBUT)
OS_TBUT: 2+
OD_TBUT: 2+

## 2025-01-13 ASSESSMENT — VISUAL ACUITY
OS_BCVA: 20/25-2
OD_BCVA: 20/25-2

## 2025-01-15 ENCOUNTER — RX ONLY (RX ONLY)
Age: 70
End: 2025-01-15

## 2025-01-15 ENCOUNTER — OFFICE (OUTPATIENT)
Dept: URBAN - METROPOLITAN AREA CLINIC 122 | Facility: CLINIC | Age: 70
Setting detail: OPHTHALMOLOGY
End: 2025-01-15
Payer: COMMERCIAL

## 2025-01-15 DIAGNOSIS — H02.9: ICD-10-CM

## 2025-01-15 DIAGNOSIS — H02.822: ICD-10-CM

## 2025-01-15 PROCEDURE — 99024 POSTOP FOLLOW-UP VISIT: CPT | Performed by: OPHTHALMOLOGY

## 2025-01-15 ASSESSMENT — REFRACTION_AUTOREFRACTION
OS_AXIS: 99
OS_SPHERE: -1.25
OS_CYLINDER: -2.00
OD_AXIS: 76
OD_SPHERE: -2.50
OD_CYLINDER: -2.00

## 2025-01-15 ASSESSMENT — KERATOMETRY
OD_AXISANGLE_DEGREES: 135
OS_K2POWER_DIOPTERS: 45.25
OD_K2POWER_DIOPTERS: 45.25
OD_K1POWER_DIOPTERS: 45.00
OS_AXISANGLE_DEGREES: 020
OS_K1POWER_DIOPTERS: 44.00

## 2025-01-15 ASSESSMENT — LID EXAM ASSESSMENTS
OD_MEIBOMITIS: RLL 1+
OD_ECCHYMOSIS: RLL 2+
OD_EDEMA: RLL 1+
OD_BLEPHARITIS: RLL 1+
OS_BLEPHARITIS: LLL T

## 2025-01-15 ASSESSMENT — TEAR BREAK UP TIME (TBUT)
OD_TBUT: 2+
OS_TBUT: 2+

## 2025-01-15 ASSESSMENT — VISUAL ACUITY
OD_BCVA: 20/25-2
OS_BCVA: 20/25-2

## 2025-01-28 ENCOUNTER — OFFICE (OUTPATIENT)
Dept: URBAN - METROPOLITAN AREA CLINIC 122 | Facility: CLINIC | Age: 70
Setting detail: OPHTHALMOLOGY
End: 2025-01-28
Payer: COMMERCIAL

## 2025-01-28 DIAGNOSIS — H52.7: ICD-10-CM

## 2025-01-28 PROBLEM — H02.822 LID LESION; RIGHT LOWER LID: Status: ACTIVE | Noted: 2025-01-13

## 2025-01-28 PROBLEM — H02.9 EYELID DISORDER, UNSPECIFIED ; RIGHT EYE: Status: ACTIVE | Noted: 2025-01-15

## 2025-01-28 PROCEDURE — CLEST CL LENS FIT ESTABLISHED WEARER FITTING ONLY

## 2025-01-28 ASSESSMENT — REFRACTION_MANIFEST
OD_SPHERE: -2.50
OS_ADD: +2.50
OS_AXIS: 095
OU_VA: 20/20
OS_CYLINDER: -2.25
OS_VA1: 20/20-2
OD_AXIS: 075
OS_SPHERE: -1.25
OD_VA1: 20/20-
OD_CYLINDER: -2.00
OD_ADD: +2.50

## 2025-01-28 ASSESSMENT — REFRACTION_CURRENTRX
OD_ADD: +2.25
OS_ADD: +2.25
OS_AXIS: 125
OS_CYLINDER: -1.75
OD_OVR_VA: 20/
OD_CYLINDER: -0.75
OS_OVR_VA: 20/
OS_SPHERE: -2.25
OD_SPHERE: -3.50
OD_AXIS: 078

## 2025-01-28 ASSESSMENT — REFRACTION_AUTOREFRACTION
OD_AXIS: 78
OS_AXIS: 96
OS_CYLINDER: -2.25
OD_SPHERE: -2.50
OD_CYLINDER: -2.00
OS_SPHERE: -1.25

## 2025-01-28 ASSESSMENT — CONFRONTATIONAL VISUAL FIELD TEST (CVF)
OD_FINDINGS: FULL
OS_FINDINGS: FULL

## 2025-01-28 ASSESSMENT — KERATOMETRY
OS_AXISANGLE_DEGREES: 020
OD_K1POWER_DIOPTERS: 45.00
OS_K1POWER_DIOPTERS: 44.00
OD_K2POWER_DIOPTERS: 45.25
OS_K2POWER_DIOPTERS: 45.25
OD_AXISANGLE_DEGREES: 135

## 2025-01-28 ASSESSMENT — VISUAL ACUITY
OD_BCVA: 20/25-2
OS_BCVA: 20/25-2

## 2025-02-05 ENCOUNTER — OFFICE (OUTPATIENT)
Dept: URBAN - METROPOLITAN AREA CLINIC 122 | Facility: CLINIC | Age: 70
Setting detail: OPHTHALMOLOGY
End: 2025-02-05
Payer: COMMERCIAL

## 2025-02-05 DIAGNOSIS — H02.822: ICD-10-CM

## 2025-02-05 PROCEDURE — 92012 INTRM OPH EXAM EST PATIENT: CPT | Performed by: OPHTHALMOLOGY

## 2025-02-05 ASSESSMENT — LID EXAM ASSESSMENTS
OD_BLEPHARITIS: RLL 1+
OD_ECCHYMOSIS: ABSENT
OD_MEIBOMITIS: RLL 1+
OD_EDEMA: ABSENT
OS_BLEPHARITIS: LLL T

## 2025-02-05 ASSESSMENT — TEAR BREAK UP TIME (TBUT)
OD_TBUT: 2+
OS_TBUT: 2+

## 2025-02-07 ASSESSMENT — REFRACTION_CURRENTRX
OD_ADD: +2.25
OS_OVR_VA: 20/
OD_OVR_VA: 20/
OD_AXIS: 078
OD_SPHERE: -3.50
OS_ADD: +2.25
OS_AXIS: 125
OS_SPHERE: -2.25
OD_CYLINDER: -0.75
OS_CYLINDER: -1.75

## 2025-02-07 ASSESSMENT — REFRACTION_MANIFEST
OD_ADD: +2.50
OS_SPHERE: -1.25
OU_VA: 20/20
OD_AXIS: 075
OD_CYLINDER: -2.00
OS_ADD: +2.50
OS_AXIS: 095
OS_CYLINDER: -2.25
OS_VA1: 20/20-2
OD_VA1: 20/20-
OD_SPHERE: -2.50

## 2025-02-07 ASSESSMENT — VISUAL ACUITY
OD_BCVA: 20/25+
OS_BCVA: 20/30+2

## 2025-02-07 ASSESSMENT — KERATOMETRY
OS_AXISANGLE_DEGREES: 020
OS_K1POWER_DIOPTERS: 44.00
OD_K1POWER_DIOPTERS: 45.00
OD_K2POWER_DIOPTERS: 45.25
OS_K2POWER_DIOPTERS: 45.25
OD_AXISANGLE_DEGREES: 135

## 2025-02-07 ASSESSMENT — REFRACTION_AUTOREFRACTION
OD_AXIS: 78
OS_CYLINDER: -2.25
OD_CYLINDER: -2.00
OS_SPHERE: -1.25
OS_AXIS: 96
OD_SPHERE: -2.50

## 2025-07-09 ENCOUNTER — OFFICE (OUTPATIENT)
Dept: URBAN - METROPOLITAN AREA CLINIC 122 | Facility: CLINIC | Age: 70
Setting detail: OPHTHALMOLOGY
End: 2025-07-09
Payer: COMMERCIAL

## 2025-07-09 DIAGNOSIS — H16.223: ICD-10-CM

## 2025-07-09 DIAGNOSIS — H01.002: ICD-10-CM

## 2025-07-09 DIAGNOSIS — H01.005: ICD-10-CM

## 2025-07-09 DIAGNOSIS — H43.813: ICD-10-CM

## 2025-07-09 DIAGNOSIS — H02.89: ICD-10-CM

## 2025-07-09 DIAGNOSIS — H25.13: ICD-10-CM

## 2025-07-09 DIAGNOSIS — D31.01: ICD-10-CM

## 2025-07-09 DIAGNOSIS — H35.362: ICD-10-CM

## 2025-07-09 DIAGNOSIS — H25.013: ICD-10-CM

## 2025-07-09 DIAGNOSIS — H43.391: ICD-10-CM

## 2025-07-09 DIAGNOSIS — H02.822: ICD-10-CM

## 2025-07-09 PROCEDURE — 92014 COMPRE OPH EXAM EST PT 1/>: CPT | Performed by: OPHTHALMOLOGY

## 2025-07-09 PROCEDURE — 92250 FUNDUS PHOTOGRAPHY W/I&R: CPT | Performed by: OPHTHALMOLOGY

## 2025-07-09 ASSESSMENT — KERATOMETRY
OD_K2POWER_DIOPTERS: 45.25
OS_K2POWER_DIOPTERS: 45.25
OD_K1POWER_DIOPTERS: 45.00
OD_AXISANGLE_DEGREES: 135
OS_K1POWER_DIOPTERS: 44.00
OS_AXISANGLE_DEGREES: 020

## 2025-07-09 ASSESSMENT — REFRACTION_MANIFEST
OS_ADD: +2.50
OS_VA1: 20/20-2
OS_CYLINDER: -2.25
OD_AXIS: 075
OD_ADD: +2.50
OU_VA: 20/20
OD_CYLINDER: -2.00
OD_VA1: 20/20-
OS_SPHERE: -1.25
OS_AXIS: 095
OD_SPHERE: -2.50

## 2025-07-09 ASSESSMENT — REFRACTION_CURRENTRX
OS_ADD: +2.25
OD_OVR_VA: 20/
OS_OVR_VA: 20/
OS_AXIS: 125
OS_SPHERE: -2.25
OD_SPHERE: -3.50
OD_ADD: +2.25
OD_AXIS: 078
OS_CYLINDER: -1.75
OD_CYLINDER: -0.75

## 2025-07-09 ASSESSMENT — CONFRONTATIONAL VISUAL FIELD TEST (CVF)
OD_FINDINGS: FULL
OS_FINDINGS: FULL

## 2025-07-09 ASSESSMENT — TONOMETRY
OS_IOP_MMHG: 12
OD_IOP_MMHG: 12

## 2025-07-09 ASSESSMENT — LID EXAM ASSESSMENTS
OD_ECCHYMOSIS: ABSENT
OS_BLEPHARITIS: LLL T
OD_BLEPHARITIS: RLL 1+
OD_EDEMA: ABSENT
OD_MEIBOMITIS: RLL 1+

## 2025-07-09 ASSESSMENT — REFRACTION_AUTOREFRACTION
OS_CYLINDER: -2.25
OD_AXIS: 78
OD_CYLINDER: -2.00
OS_SPHERE: -1.25
OD_SPHERE: -2.50
OS_AXIS: 96

## 2025-07-09 ASSESSMENT — VISUAL ACUITY
OD_BCVA: 20/25+
OS_BCVA: 20/30+2

## 2025-07-09 ASSESSMENT — TEAR BREAK UP TIME (TBUT)
OD_TBUT: 2+
OS_TBUT: 2+

## 2025-08-20 ENCOUNTER — RX ONLY (RX ONLY)
Age: 70
End: 2025-08-20

## 2025-08-20 ENCOUNTER — OFFICE (OUTPATIENT)
Dept: URBAN - METROPOLITAN AREA CLINIC 122 | Facility: CLINIC | Age: 70
Setting detail: OPHTHALMOLOGY
End: 2025-08-20
Payer: COMMERCIAL

## 2025-08-20 DIAGNOSIS — H00.15: ICD-10-CM

## 2025-08-20 DIAGNOSIS — H00.12: ICD-10-CM

## 2025-08-20 PROCEDURE — 92012 INTRM OPH EXAM EST PATIENT: CPT | Performed by: OPHTHALMOLOGY

## 2025-08-20 ASSESSMENT — LID EXAM ASSESSMENTS
OD_BLEPHARITIS: RLL 1+
OD_MEIBOMITIS: RLL 1+
OD_ECCHYMOSIS: ABSENT
OD_EDEMA: ABSENT
OS_BLEPHARITIS: LLL T

## 2025-08-20 ASSESSMENT — KERATOMETRY
OS_K2POWER_DIOPTERS: 45.25
OD_AXISANGLE_DEGREES: 135
OD_K1POWER_DIOPTERS: 45.00
OS_AXISANGLE_DEGREES: 020
OS_K1POWER_DIOPTERS: 44.00
OD_K2POWER_DIOPTERS: 45.25

## 2025-08-20 ASSESSMENT — REFRACTION_CURRENTRX
OS_ADD: +2.25
OS_CYLINDER: -1.75
OD_OVR_VA: 20/
OD_ADD: +2.25
OD_AXIS: 078
OD_CYLINDER: -0.75
OD_SPHERE: -3.50
OS_SPHERE: -2.25
OS_OVR_VA: 20/
OS_AXIS: 125

## 2025-08-20 ASSESSMENT — TEAR BREAK UP TIME (TBUT)
OS_TBUT: 2+
OD_TBUT: 2+

## 2025-08-20 ASSESSMENT — REFRACTION_AUTOREFRACTION
OD_AXIS: 78
OD_SPHERE: -2.50
OS_AXIS: 96
OD_CYLINDER: -2.00
OS_SPHERE: -1.25
OS_CYLINDER: -2.25

## 2025-08-20 ASSESSMENT — REFRACTION_MANIFEST
OD_SPHERE: -2.50
OU_VA: 20/20
OS_VA1: 20/20-2
OD_CYLINDER: -2.00
OD_VA1: 20/20-
OD_ADD: +2.50
OS_ADD: +2.50
OS_AXIS: 095
OD_AXIS: 075
OS_CYLINDER: -2.25
OS_SPHERE: -1.25

## 2025-08-20 ASSESSMENT — VISUAL ACUITY
OS_BCVA: 20/25-2
OD_BCVA: 20/20

## 2025-08-20 ASSESSMENT — TONOMETRY
OD_IOP_MMHG: 16
OS_IOP_MMHG: 16